# Patient Record
Sex: MALE | Race: WHITE | NOT HISPANIC OR LATINO | Employment: FULL TIME | URBAN - METROPOLITAN AREA
[De-identification: names, ages, dates, MRNs, and addresses within clinical notes are randomized per-mention and may not be internally consistent; named-entity substitution may affect disease eponyms.]

---

## 2024-04-03 ENCOUNTER — HOSPITAL ENCOUNTER (EMERGENCY)
Facility: CLINIC | Age: 49
Discharge: HOME | End: 2024-04-03
Attending: EMERGENCY MEDICINE | Admitting: EMERGENCY MEDICINE
Payer: COMMERCIAL

## 2024-04-03 ENCOUNTER — APPOINTMENT (OUTPATIENT)
Dept: CT IMAGING | Facility: CLINIC | Age: 49
End: 2024-04-03
Attending: EMERGENCY MEDICINE
Payer: COMMERCIAL

## 2024-04-03 VITALS
RESPIRATION RATE: 19 BRPM | DIASTOLIC BLOOD PRESSURE: 87 MMHG | OXYGEN SATURATION: 96 % | WEIGHT: 285 LBS | HEIGHT: 76 IN | HEART RATE: 65 BPM | BODY MASS INDEX: 34.7 KG/M2 | TEMPERATURE: 97.7 F | SYSTOLIC BLOOD PRESSURE: 125 MMHG

## 2024-04-03 DIAGNOSIS — R51.9 HEADACHE, UNSPECIFIED HEADACHE TYPE: ICD-10-CM

## 2024-04-03 LAB
ANION GAP SERPL CALCULATED.3IONS-SCNC: 14 MMOL/L (ref 7–15)
BASOPHILS # BLD AUTO: 0 10E3/UL (ref 0–0.2)
BASOPHILS NFR BLD AUTO: 0 %
BUN SERPL-MCNC: 16.7 MG/DL (ref 6–20)
CALCIUM SERPL-MCNC: 9.8 MG/DL (ref 8.6–10)
CHLORIDE SERPL-SCNC: 97 MMOL/L (ref 98–107)
CREAT SERPL-MCNC: 0.99 MG/DL (ref 0.67–1.17)
DEPRECATED HCO3 PLAS-SCNC: 26 MMOL/L (ref 22–29)
EGFRCR SERPLBLD CKD-EPI 2021: >90 ML/MIN/1.73M2
EOSINOPHIL # BLD AUTO: 0.1 10E3/UL (ref 0–0.7)
EOSINOPHIL NFR BLD AUTO: 1 %
ERYTHROCYTE [DISTWIDTH] IN BLOOD BY AUTOMATED COUNT: 13.5 % (ref 10–15)
GLUCOSE SERPL-MCNC: 119 MG/DL (ref 70–99)
HCT VFR BLD AUTO: 47.3 % (ref 40–53)
HGB BLD-MCNC: 15.8 G/DL (ref 13.3–17.7)
IMM GRANULOCYTES # BLD: 0 10E3/UL
IMM GRANULOCYTES NFR BLD: 0 %
LYMPHOCYTES # BLD AUTO: 1.4 10E3/UL (ref 0.8–5.3)
LYMPHOCYTES NFR BLD AUTO: 24 %
MCH RBC QN AUTO: 27.7 PG (ref 26.5–33)
MCHC RBC AUTO-ENTMCNC: 33.4 G/DL (ref 31.5–36.5)
MCV RBC AUTO: 83 FL (ref 78–100)
MONOCYTES # BLD AUTO: 0.3 10E3/UL (ref 0–1.3)
MONOCYTES NFR BLD AUTO: 5 %
NEUTROPHILS # BLD AUTO: 4 10E3/UL (ref 1.6–8.3)
NEUTROPHILS NFR BLD AUTO: 70 %
NRBC # BLD AUTO: 0 10E3/UL
NRBC BLD AUTO-RTO: 0 /100
PLATELET # BLD AUTO: 194 10E3/UL (ref 150–450)
POTASSIUM SERPL-SCNC: 4.8 MMOL/L (ref 3.4–5.3)
RBC # BLD AUTO: 5.7 10E6/UL (ref 4.4–5.9)
SODIUM SERPL-SCNC: 137 MMOL/L (ref 135–145)
WBC # BLD AUTO: 5.7 10E3/UL (ref 4–11)

## 2024-04-03 PROCEDURE — 99285 EMERGENCY DEPT VISIT HI MDM: CPT | Mod: 25

## 2024-04-03 PROCEDURE — 70450 CT HEAD/BRAIN W/O DYE: CPT

## 2024-04-03 PROCEDURE — 36415 COLL VENOUS BLD VENIPUNCTURE: CPT | Performed by: EMERGENCY MEDICINE

## 2024-04-03 PROCEDURE — 85025 COMPLETE CBC W/AUTO DIFF WBC: CPT | Performed by: EMERGENCY MEDICINE

## 2024-04-03 PROCEDURE — 96375 TX/PRO/DX INJ NEW DRUG ADDON: CPT

## 2024-04-03 PROCEDURE — 80048 BASIC METABOLIC PNL TOTAL CA: CPT | Performed by: EMERGENCY MEDICINE

## 2024-04-03 PROCEDURE — 96374 THER/PROPH/DIAG INJ IV PUSH: CPT

## 2024-04-03 PROCEDURE — 250N000011 HC RX IP 250 OP 636: Performed by: EMERGENCY MEDICINE

## 2024-04-03 RX ORDER — METOCLOPRAMIDE HYDROCHLORIDE 5 MG/ML
10 INJECTION INTRAMUSCULAR; INTRAVENOUS ONCE
Status: COMPLETED | OUTPATIENT
Start: 2024-04-03 | End: 2024-04-03

## 2024-04-03 RX ORDER — DIPHENHYDRAMINE HYDROCHLORIDE 50 MG/ML
25 INJECTION INTRAMUSCULAR; INTRAVENOUS ONCE
Status: COMPLETED | OUTPATIENT
Start: 2024-04-03 | End: 2024-04-03

## 2024-04-03 RX ORDER — KETOROLAC TROMETHAMINE 15 MG/ML
15 INJECTION, SOLUTION INTRAMUSCULAR; INTRAVENOUS ONCE
Status: COMPLETED | OUTPATIENT
Start: 2024-04-03 | End: 2024-04-03

## 2024-04-03 RX ADMIN — METOCLOPRAMIDE HYDROCHLORIDE 10 MG: 5 INJECTION INTRAMUSCULAR; INTRAVENOUS at 09:47

## 2024-04-03 RX ADMIN — KETOROLAC TROMETHAMINE 15 MG: 15 INJECTION, SOLUTION INTRAMUSCULAR; INTRAVENOUS at 09:47

## 2024-04-03 RX ADMIN — DIPHENHYDRAMINE HYDROCHLORIDE 25 MG: 50 INJECTION, SOLUTION INTRAMUSCULAR; INTRAVENOUS at 09:47

## 2024-04-03 ASSESSMENT — ACTIVITIES OF DAILY LIVING (ADL)
ADLS_ACUITY_SCORE: 35
ADLS_ACUITY_SCORE: 35

## 2024-04-03 ASSESSMENT — COLUMBIA-SUICIDE SEVERITY RATING SCALE - C-SSRS
1. IN THE PAST MONTH, HAVE YOU WISHED YOU WERE DEAD OR WISHED YOU COULD GO TO SLEEP AND NOT WAKE UP?: NO
2. HAVE YOU ACTUALLY HAD ANY THOUGHTS OF KILLING YOURSELF IN THE PAST MONTH?: NO
6. HAVE YOU EVER DONE ANYTHING, STARTED TO DO ANYTHING, OR PREPARED TO DO ANYTHING TO END YOUR LIFE?: NO

## 2024-04-03 NOTE — ED TRIAGE NOTES
Pt arrives ambulatory for headache pain for two weeks that is worsening. All over headache with intermittent dizziness. No medications taken today. Takes lisinopril and atenolol at night. Of note, pt began carb/sugar free diet a couple weeks ago but ate normally over easter and still had symptoms.

## 2024-04-03 NOTE — ED PROVIDER NOTES
"  History     Chief Complaint:  Headache     The history is provided by the patient.      Tato Mckeon is a 49 year old male with history of PMS2-related Patterson syndrome, colon cancer s/p hemicolectomy in remission, hypertension, and hyperlipidemia who presents to the ED for evaluation of a headache. Tato reports 2 weeks of a diffuse, achy, and dull headache. He developed diarrhea, headache, and lightheadedness about 2 weeks ago and his diarrhea has since improved but headache has persisted. His headache worsened last night and this morning, prompting his ED visit today. He scores his current pain 6/10. He also endorses dizziness and weakness. Headache did not improve with 1000 mg Tylenol yesterday. Patient denies dehydration, fever, nausea, vomiting, diarrhea, falls, head injury, or neck pain. No blood thinners use. No personal or family history of migraines. Tato notes he started a keto diet a few weeks ago but did not follow this diet over the Easter holiday.    Independent Historian:   None - Patient Only      Medications:    Pantoprazole  Atenolol  Lisinopril-hydrochlorothiazide     Past Medical History:    Adenomatous polyp of colon  PMS2-related Patterson syndrome  Iron deficiency anemia  Malignant neoplasm of ascending colon  Hyperlipidemia  Schatzki's ring  Hypertension  GERD    Past Surgical History:    Right hemicolectomy  Knee arthroscopy    Physical Exam   Patient Vitals for the past 24 hrs:   BP Temp Temp src Pulse Resp SpO2 Height Weight   04/03/24 1121 125/87 -- -- 65 19 96 % -- --   04/03/24 0943 -- -- -- -- -- 98 % -- --   04/03/24 0942 -- -- -- -- -- 97 % -- --   04/03/24 0940 -- -- -- -- -- 96 % -- --   04/03/24 0925 (!) 143/95 -- -- -- -- 97 % -- --   04/03/24 0920 (!) 143/95 97.7  F (36.5  C) Oral 76 19 99 % -- --   04/03/24 0914 (!) 140/87 97.6  F (36.4  C) Temporal 87 18 100 % 1.918 m (6' 3.5\") 129.3 kg (285 lb)     Physical Exam  General: Patient is alert and cooperative.  HENT:  No " facial weakness, swelling, or asymmetry.  No nasal congestion.    Eyes: EOMI. Normal conjunctiva.  Neck:  Normal range of motion and appearance. No tenderness.   Cardiovascular:  Normal rate.   Pulmonary/Chest:  Effort normal.   Musculoskeletal: Normal range of motion.   Neurological: oriented, normal strength, sensation, and coordination.   Skin: Warm and dry. No rash.   Psychiatric: Normal mood and affect. Normal behavior and judgement.      Emergency Department Course     Imaging:  CT Head w/o Contrast   Preliminary Result   IMPRESSION:   1. No CT findings of acute intracranial process.   2. Mild presumed age-related changes of the brain, as described.         Report per radiology    Laboratory:  Labs Ordered and Resulted from Time of ED Arrival to Time of ED Departure   BASIC METABOLIC PANEL - Abnormal       Result Value    Sodium 137      Potassium 4.8      Chloride 97 (*)     Carbon Dioxide (CO2) 26      Anion Gap 14      Urea Nitrogen 16.7      Creatinine 0.99      GFR Estimate >90      Calcium 9.8      Glucose 119 (*)    CBC WITH PLATELETS AND DIFFERENTIAL    WBC Count 5.7      RBC Count 5.70      Hemoglobin 15.8      Hematocrit 47.3      MCV 83      MCH 27.7      MCHC 33.4      RDW 13.5      Platelet Count 194      % Neutrophils 70      % Lymphocytes 24      % Monocytes 5      % Eosinophils 1      % Basophils 0      % Immature Granulocytes 0      NRBCs per 100 WBC 0      Absolute Neutrophils 4.0      Absolute Lymphocytes 1.4      Absolute Monocytes 0.3      Absolute Eosinophils 0.1      Absolute Basophils 0.0      Absolute Immature Granulocytes 0.0      Absolute NRBCs 0.0          Procedures   None    Emergency Department Course & Assessments:  Assessments/Consultations/Discussion of Management or Tests:  ED Course as of 04/03/24 1133   Wed Apr 03, 2024   0920 My PA student, June, obtained history and examined the patient.    0909 I obtained history and examined the patient as noted above.    1118 I  rechecked the patient and explained findings. Patient discharged home with instructions regarding supportive care, medications, and reasons to return. The importance of close follow-up was reviewed.        Interventions:  Medications   ketorolac (TORADOL) injection 15 mg (15 mg Intravenous $Given 4/3/24 0947)   metoclopramide (REGLAN) injection 10 mg (10 mg Intravenous $Given 4/3/24 0947)   diphenhydrAMINE (BENADRYL) injection 25 mg (25 mg Intravenous $Given 4/3/24 0947)        Social Determinants of Health affecting care:   None    Disposition:  The patient was discharged to home.     Impression & Plan    CMS Diagnoses: None    MIPS (If applicable):  N/A    Medical Decision Making:  Afebrile and well-appearing 49-year-old male with a 2-week history of generalized headache.  There is no thunderclap onset to suggest subarachnoid hemorrhage.  No recent fever or neck discomfort.  No concern for meningitis.  He has no neurologic symptoms, but does have a history of colon cancer with Patterson syndrome.  It is rare for him to get a headache.  For these reasons, I felt neuroimaging was prudent which he was interested in.  A noncontrast head CT was performed and is normal.  He did respond to IV Toradol Reglan and Benadryl and is now comfortable with discharge home.  I recommended over-the-counter NSAIDs or Tylenol as needed and follow-up with clinic for further assistance if ongoing recurring headaches or concerns.    Diagnosis:    ICD-10-CM    1. Headache, unspecified headache type  R51.9           Discharge Medications:  There are no discharge medications for this patient.       Scribe Disclosure:  Simona CEDEÑO Hailie, am serving as a scribe at 9:17 AM on 4/3/2024 to document services personally performed by Adilson Alejandra MD based on my observations and the provider's statements to me.  4/3/2024   Adilson Alejandra MD Isaacson, Brian A, MD  04/03/24 3313

## 2025-07-21 ENCOUNTER — APPOINTMENT (OUTPATIENT)
Dept: GENERAL RADIOLOGY | Facility: CLINIC | Age: 50
End: 2025-07-21
Attending: EMERGENCY MEDICINE
Payer: COMMERCIAL

## 2025-07-21 ENCOUNTER — HOSPITAL ENCOUNTER (EMERGENCY)
Facility: CLINIC | Age: 50
Discharge: HOME | End: 2025-07-22
Attending: EMERGENCY MEDICINE | Admitting: EMERGENCY MEDICINE
Payer: COMMERCIAL

## 2025-07-21 DIAGNOSIS — R07.9 CHEST PAIN, UNSPECIFIED TYPE: ICD-10-CM

## 2025-07-21 DIAGNOSIS — R13.10 ODYNOPHAGIA: ICD-10-CM

## 2025-07-21 LAB
ANION GAP SERPL CALCULATED.3IONS-SCNC: 13 MMOL/L (ref 7–15)
BASOPHILS # BLD AUTO: 0 10E3/UL (ref 0–0.2)
BASOPHILS NFR BLD AUTO: 0 %
BUN SERPL-MCNC: 17.2 MG/DL (ref 6–20)
CALCIUM SERPL-MCNC: 9.6 MG/DL (ref 8.8–10.4)
CHLORIDE SERPL-SCNC: 101 MMOL/L (ref 98–107)
CREAT SERPL-MCNC: 0.84 MG/DL (ref 0.67–1.17)
D DIMER PPP FEU-MCNC: <0.27 UG/ML FEU (ref 0–0.5)
EGFRCR SERPLBLD CKD-EPI 2021: >90 ML/MIN/1.73M2
EOSINOPHIL # BLD AUTO: 0.1 10E3/UL (ref 0–0.7)
EOSINOPHIL NFR BLD AUTO: 1 %
ERYTHROCYTE [DISTWIDTH] IN BLOOD BY AUTOMATED COUNT: 13.4 % (ref 10–15)
GLUCOSE SERPL-MCNC: 111 MG/DL (ref 70–99)
HCO3 SERPL-SCNC: 23 MMOL/L (ref 22–29)
HCT VFR BLD AUTO: 44.2 % (ref 40–53)
HGB BLD-MCNC: 15 G/DL (ref 13.3–17.7)
HOLD SPECIMEN: NORMAL
HOLD SPECIMEN: NORMAL
IMM GRANULOCYTES # BLD: 0 10E3/UL
IMM GRANULOCYTES NFR BLD: 0 %
LYMPHOCYTES # BLD AUTO: 2 10E3/UL (ref 0.8–5.3)
LYMPHOCYTES NFR BLD AUTO: 20 %
MCH RBC QN AUTO: 27.8 PG (ref 26.5–33)
MCHC RBC AUTO-ENTMCNC: 33.9 G/DL (ref 31.5–36.5)
MCV RBC AUTO: 82 FL (ref 78–100)
MONOCYTES # BLD AUTO: 0.6 10E3/UL (ref 0–1.3)
MONOCYTES NFR BLD AUTO: 5 %
NEUTROPHILS # BLD AUTO: 7.5 10E3/UL (ref 1.6–8.3)
NEUTROPHILS NFR BLD AUTO: 73 %
NRBC # BLD AUTO: 0 10E3/UL
NRBC BLD AUTO-RTO: 0 /100
PLATELET # BLD AUTO: 251 10E3/UL (ref 150–450)
POTASSIUM SERPL-SCNC: 4.2 MMOL/L (ref 3.4–5.3)
RBC # BLD AUTO: 5.39 10E6/UL (ref 4.4–5.9)
SODIUM SERPL-SCNC: 137 MMOL/L (ref 135–145)
TROPONIN T SERPL HS-MCNC: <6 NG/L
WBC # BLD AUTO: 10.2 10E3/UL (ref 4–11)

## 2025-07-21 PROCEDURE — 99285 EMERGENCY DEPT VISIT HI MDM: CPT | Mod: 25

## 2025-07-21 PROCEDURE — 80048 BASIC METABOLIC PNL TOTAL CA: CPT | Performed by: EMERGENCY MEDICINE

## 2025-07-21 PROCEDURE — 93005 ELECTROCARDIOGRAM TRACING: CPT

## 2025-07-21 PROCEDURE — 71046 X-RAY EXAM CHEST 2 VIEWS: CPT

## 2025-07-21 PROCEDURE — 85004 AUTOMATED DIFF WBC COUNT: CPT | Performed by: EMERGENCY MEDICINE

## 2025-07-21 PROCEDURE — 84484 ASSAY OF TROPONIN QUANT: CPT | Performed by: EMERGENCY MEDICINE

## 2025-07-21 PROCEDURE — 85379 FIBRIN DEGRADATION QUANT: CPT | Performed by: EMERGENCY MEDICINE

## 2025-07-21 PROCEDURE — 96374 THER/PROPH/DIAG INJ IV PUSH: CPT

## 2025-07-21 PROCEDURE — 36415 COLL VENOUS BLD VENIPUNCTURE: CPT | Performed by: EMERGENCY MEDICINE

## 2025-07-21 PROCEDURE — 250N000011 HC RX IP 250 OP 636: Performed by: EMERGENCY MEDICINE

## 2025-07-21 RX ORDER — KETOROLAC TROMETHAMINE 15 MG/ML
15 INJECTION, SOLUTION INTRAMUSCULAR; INTRAVENOUS ONCE
Status: COMPLETED | OUTPATIENT
Start: 2025-07-21 | End: 2025-07-21

## 2025-07-21 RX ADMIN — KETOROLAC TROMETHAMINE 15 MG: 15 INJECTION, SOLUTION INTRAMUSCULAR; INTRAVENOUS at 22:43

## 2025-07-21 ASSESSMENT — ACTIVITIES OF DAILY LIVING (ADL)
ADLS_ACUITY_SCORE: 41

## 2025-07-22 VITALS
DIASTOLIC BLOOD PRESSURE: 78 MMHG | OXYGEN SATURATION: 97 % | TEMPERATURE: 97.1 F | SYSTOLIC BLOOD PRESSURE: 126 MMHG | RESPIRATION RATE: 16 BRPM | HEART RATE: 65 BPM

## 2025-07-22 LAB
ATRIAL RATE - MUSE: 76 BPM
DIASTOLIC BLOOD PRESSURE - MUSE: NORMAL MMHG
INTERPRETATION ECG - MUSE: NORMAL
P AXIS - MUSE: 14 DEGREES
PR INTERVAL - MUSE: 188 MS
QRS DURATION - MUSE: 96 MS
QT - MUSE: 360 MS
QTC - MUSE: 405 MS
R AXIS - MUSE: 14 DEGREES
SYSTOLIC BLOOD PRESSURE - MUSE: NORMAL MMHG
T AXIS - MUSE: 18 DEGREES
TROPONIN T SERPL HS-MCNC: <6 NG/L
VENTRICULAR RATE- MUSE: 76 BPM

## 2025-07-22 NOTE — ED TRIAGE NOTES
Here for concern left sided chest and shoulder pain ongoing for couple weeks but is getting worse for the past 3-4 days. Just arrived from [place] flight. Denies any injury. History of colon cancer, currently in remission. ABCs intact.      Triage Assessment (Adult)       Row Name 07/21/25 2038          Triage Assessment    Airway WDL WDL        Respiratory WDL    Respiratory WDL WDL        Cardiac WDL    Cardiac WDL chest pain

## 2025-07-22 NOTE — ED PROVIDER NOTES
Emergency Department Note      History of Present Illness     Chief Complaint   Shoulder Pain and Chest Pain    HPI   Tato Mckeon is a 50 year old male who presents with left neck and throat pain, more noticeable when swallowing. Pain has migrated to upper chest. Neck pain and swallowing discomfort symptoms started a few weeks ago without injury. No trouble eating. Pt has noticed 3-4 days of upper chest discomfort that is usually present. Chest wall movement seems worsen symptoms.  No fever or cough. No SOB. Pt just flew in from [place]. NO leg swelling. NO known heart or lung conditions. Pt was treated in 2020 for colon cancer and underwent hemicolectomy and chemotherapy. He is currently in remission as far as he knows. 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 He had EGD 1/25 for surveillance purposes. No history of blood clots or heart attacks.     Independent Historian   None    Review of External Notes   1/27/25 EGD    Impression:            - Normal esophagus.                          - A few gastric polyps. Biopsied.                          - Normal examined duodenum.     Past Medical History     Medical History and Problem List   No past medical history on file.    Medications   No current outpatient medications on file.      Surgical History   Past Surgical History:   Procedure Laterality Date    IR CHEST PORT PLACEMENT > 5 YRS OF AGE  3/26/2020       Physical Exam     Patient Vitals for the past 24 hrs:   BP Temp Temp src Pulse Resp SpO2   07/22/25 0018 126/78 -- -- 65 16 97 %   07/21/25 2039 (!) 154/104 97.1  F (36.2  C) Temporal 85 18 98 %     Physical Exam  VS: Reviewed per above  HENT: Mucous membranes moist. No reproducible left neck ttp.  Normal phonation, tolerating secretions, no nuchal rigidity.  EYES: sclera anicteric  CV: Rate as noted,  regular rhythm. BL radial pulses at wrists are equal.   RESP: Effort normal. Breath sounds are normal bilaterally.  GI: no tenderness/rebound/guarding, not  distended.  NEURO: Alert, moving all extremities  MSK: No deformity of the extremities, no pain with passive range of motion of left shoulder.  No focal tenderness or rashes overlying the upper anterior chest wall.  SKIN: Warm and dry      Diagnostics     Lab Results   Labs Ordered and Resulted from Time of ED Arrival to Time of ED Departure   BASIC METABOLIC PANEL - Abnormal       Result Value    Sodium 137      Potassium 4.2      Chloride 101      Carbon Dioxide (CO2) 23      Anion Gap 13      Urea Nitrogen 17.2      Creatinine 0.84      GFR Estimate >90      Calcium 9.6      Glucose 111 (*)    TROPONIN T, HIGH SENSITIVITY - Normal    Troponin T, High Sensitivity <6     D DIMER QUANTITATIVE - Normal    D-Dimer Quantitative <0.27     TROPONIN T, HIGH SENSITIVITY - Normal    Troponin T, High Sensitivity <6     CBC WITH PLATELETS AND DIFFERENTIAL    WBC Count 10.2      RBC Count 5.39      Hemoglobin 15.0      Hematocrit 44.2      MCV 82      MCH 27.8      MCHC 33.9      RDW 13.4      Platelet Count 251      % Neutrophils 73      % Lymphocytes 20      % Monocytes 5      % Eosinophils 1      % Basophils 0      % Immature Granulocytes 0      NRBCs per 100 WBC 0      Absolute Neutrophils 7.5      Absolute Lymphocytes 2.0      Absolute Monocytes 0.6      Absolute Eosinophils 0.1      Absolute Basophils 0.0      Absolute Immature Granulocytes 0.0      Absolute NRBCs 0.0         Imaging   Chest XR,  PA & LAT   Final Result   IMPRESSION: No evidence of active cardiopulmonary disease.           EKG   ECG results from 07/21/25   EKG 12-lead, tracing only     Value    Systolic Blood Pressure     Diastolic Blood Pressure     Ventricular Rate 76    Atrial Rate 76    OH Interval 188    QRS Duration 96        QTc 405    P Axis 14    R AXIS 14    T Axis 18    Interpretation ECG      Sinus rhythm  Normal ECG  No previous ECGs available           Independent Interpretation   None    ED Course      Medications Administered    Medications   ketorolac (TORADOL) injection 15 mg (15 mg Intravenous $Given 7/21/25 3446)       Procedures   Procedures     Discussion of Management   None    ED Course        Additional Documentation  None    Medical Decision Making / Diagnosis     CMS Diagnoses: None    MIPS   None               [facility]   Tato Mckeon is a 50 year old male who presents to the ER for evaluation of recent odynophagia as well as some upper chest discomfort and left neck discomfort and left shoulder discomfort.  Vital signs reassuring.  On exam there are no signs of skin or soft tissue infection or septic arthropathy.  ECG and serial troponin does not suggest evidence of ACS or myocarditis/pericarditis.  Normal D-dimer speaks against occult PE.  Chest x-ray clear as well. CXR and hx and exam seems inconsistent with aortic emergency.  All patient describes some odynophagia, he has no signs of PTA, RPA, epiglottitis or trouble swallowing/dysphagia.  Patient does have upcoming chest abdomen and pelvis CT tomorrow for surveillance purposes given history of colon cancer.  I think this is certainly valuable, but from an emergency perspective, no indication for CT imaging today.  Recommended ongoing primary care follow-up to discuss further and  return precautions discussed.    Disposition   The patient was discharged.     Diagnosis     ICD-10-CM    1. Chest pain, unspecified type  R07.9       2. Odynophagia  R13.10            Discharge Medications   There are no discharge medications for this patient.               Babatunde Mendoza MD  07/22/25 0141